# Patient Record
Sex: FEMALE | Race: BLACK OR AFRICAN AMERICAN | URBAN - METROPOLITAN AREA
[De-identification: names, ages, dates, MRNs, and addresses within clinical notes are randomized per-mention and may not be internally consistent; named-entity substitution may affect disease eponyms.]

---

## 2023-12-22 ENCOUNTER — HOSPITAL ENCOUNTER (EMERGENCY)
Facility: HOSPITAL | Age: 33
Discharge: HOME | End: 2023-12-22
Payer: COMMERCIAL

## 2023-12-22 VITALS
HEART RATE: 72 BPM | RESPIRATION RATE: 20 BRPM | WEIGHT: 127 LBS | BODY MASS INDEX: 19.93 KG/M2 | SYSTOLIC BLOOD PRESSURE: 120 MMHG | HEIGHT: 67 IN | DIASTOLIC BLOOD PRESSURE: 63 MMHG | TEMPERATURE: 96.9 F | OXYGEN SATURATION: 96 %

## 2023-12-22 DIAGNOSIS — K64.9 HEMORRHOIDS, UNSPECIFIED HEMORRHOID TYPE: Primary | ICD-10-CM

## 2023-12-22 PROCEDURE — 99283 EMERGENCY DEPT VISIT LOW MDM: CPT

## 2023-12-22 RX ORDER — DIBUCAINE 1 %
OINTMENT (GRAM) TOPICAL EVERY 6 HOURS
Qty: 28 G | Refills: 0 | Status: SHIPPED | OUTPATIENT
Start: 2023-12-22 | End: 2024-01-01

## 2023-12-22 RX ORDER — POLYETHYLENE GLYCOL 3350 17 G/17G
17 POWDER, FOR SOLUTION ORAL DAILY
Qty: 170 G | Refills: 0 | Status: SHIPPED | OUTPATIENT
Start: 2023-12-22 | End: 2024-01-01

## 2023-12-22 ASSESSMENT — PAIN - FUNCTIONAL ASSESSMENT: PAIN_FUNCTIONAL_ASSESSMENT: 0-10

## 2023-12-22 ASSESSMENT — PAIN DESCRIPTION - LOCATION: LOCATION: RECTUM

## 2023-12-22 ASSESSMENT — PAIN SCALES - GENERAL: PAINLEVEL_OUTOF10: 2

## 2023-12-22 ASSESSMENT — PAIN DESCRIPTION - DESCRIPTORS: DESCRIPTORS: SORE

## 2023-12-22 NOTE — ED PROVIDER NOTES
HPI   Chief Complaint   Patient presents with   • Rectal Pain       33-year-old female with possible hemorrhoid.  Through telemedicine she obtained steroid suppository that she has been using 3 days.  Some improvement.  Still has a small amount of blood with bowel movements.  Blood on the tissue.  Mild discomfort rectal area.  Has had issues constipation.  But has been taking stool softeners.  No prior history of hemorrhoids or rectal disorders.  She is on no other prescription medicines.  No known allergies.      History provided by:  Patient   used: No                        [place] Coma Scale Score: 15                  Patient History   History reviewed. No pertinent past medical history.  History reviewed. No pertinent surgical history.  No family history on file.  Social History     Tobacco Use   • Smoking status: Never   • Smokeless tobacco: Never   Substance Use Topics   • Alcohol use: Not on file     Comment: socially   • Drug use: Never       Physical Exam   ED Triage Vitals [12/22/23 1352]   Temp Heart Rate Resp BP   36.1 °C (96.9 °F) 72 20 120/63      SpO2 Temp Source Heart Rate Source Patient Position   96 % Temporal -- --      BP Location FiO2 (%)     -- --       Physical Exam  Vitals and nursing note reviewed. Exam conducted with a chaperone present.   Constitutional:       General: She is not in acute distress.     Appearance: Normal appearance. She is not ill-appearing, toxic-appearing or diaphoretic.   HENT:      Head: Normocephalic and atraumatic.   Cardiovascular:      Rate and Rhythm: Normal rate.   Pulmonary:      Effort: Pulmonary effort is normal.   Abdominal:      Palpations: Abdomen is soft.      Tenderness: There is no abdominal tenderness.   Genitourinary:     Comments: Chaperone present Manasa LPN.  TTP with digital exam Limited exam.  Likely small hemorrhoid.  No obvious fissure or bleeding.  There is no thrombosed hemorrhoids.  No visible or palpable abscess or  fluctuance.  No bleeding during exam.  No soft tissue buttock pain tenderness or fluctuance.  Musculoskeletal:         General: Normal range of motion.   Skin:     General: Skin is warm and dry.   Neurological:      General: No focal deficit present.      Mental Status: She is alert and oriented to person, place, and time.   Psychiatric:         Mood and Affect: Mood normal.         Behavior: Behavior normal.         ED Course & MDM   ED Course as of 12/22/23 1535   Fri Dec 22, 2023   1523 Rectal exam done as noted above.  Patient to continue steroid suppository.  Also prescribed Bartolo became suppository.  Advised sitz bath's 4 times daily.  Tylenol Motrin for pain.  MiraLAX or stool softener.  Patient is from [place] and will follow-up with GI doctor when she returns next week.  She was provided local referrals and advised return to ED if condition worsens fever uncontrolled bleeding or pain.  Stable for discharge outpatient management [GA]      ED Course User Index  [GA] Isaiah Lopez PA-C         Diagnoses as of 12/22/23 1535   Hemorrhoids, unspecified hemorrhoid type       Medical Decision Making      Procedure  Procedures     Isaiah Lopez PA-C  12/22/23 1536